# Patient Record
Sex: FEMALE | URBAN - METROPOLITAN AREA
[De-identification: names, ages, dates, MRNs, and addresses within clinical notes are randomized per-mention and may not be internally consistent; named-entity substitution may affect disease eponyms.]

---

## 2018-08-10 ENCOUNTER — OFFICE VISIT (OUTPATIENT)
Dept: FAMILY MEDICINE CLINIC | Age: 23
End: 2018-08-10

## 2018-08-10 VITALS
HEART RATE: 72 BPM | OXYGEN SATURATION: 99 % | TEMPERATURE: 98.4 F | DIASTOLIC BLOOD PRESSURE: 86 MMHG | RESPIRATION RATE: 18 BRPM | SYSTOLIC BLOOD PRESSURE: 129 MMHG | HEIGHT: 69 IN

## 2018-08-10 DIAGNOSIS — R21 RASH: ICD-10-CM

## 2018-08-10 DIAGNOSIS — F32.89 OTHER DEPRESSION: ICD-10-CM

## 2018-08-10 DIAGNOSIS — K60.2 ANAL FISSURE: Primary | ICD-10-CM

## 2018-08-10 PROBLEM — F32.A MILD DEPRESSION: Status: ACTIVE | Noted: 2018-08-10

## 2018-08-10 RX ORDER — CLOTRIMAZOLE AND BETAMETHASONE DIPROPIONATE 10; .64 MG/G; MG/G
CREAM TOPICAL 2 TIMES DAILY
Qty: 30 G | Refills: 1 | Status: SHIPPED | OUTPATIENT
Start: 2018-08-10

## 2018-08-10 RX ORDER — BUPROPION HYDROCHLORIDE 75 MG/1
75 TABLET ORAL 2 TIMES DAILY
Qty: 60 TAB | Refills: 2 | Status: SHIPPED | OUTPATIENT
Start: 2018-08-10 | End: 2018-09-21 | Stop reason: SDUPTHER

## 2018-08-10 RX ORDER — NORETHINDRONE ACETATE AND ETHINYL ESTRADIOL, ETHINYL ESTRADIOL AND FERROUS FUMARATE 1MG-10(24)
1 KIT ORAL DAILY
COMMUNITY
Start: 2018-06-20

## 2018-08-10 NOTE — MR AVS SNAPSHOT
[address] [address] 
[phone] Patient: Owen Biggs MRN: [id number] :1995 Visit Information Date & Time Provider Department Dept. Phone Encounter #  
 8/10/2018 11:00 AM Mayte Inman MD [address] [place] [address] [id number] Follow-up Instructions Return in about 1 month (around 9/10/2018). Upcoming Health Maintenance Date Due Hepatitis A Peds Age 1-18 (1 of 2 - Standard Series) 10/28/1998 DTaP/Tdap/Td series (1 - Tdap) 10/28/2004 HPV Age 9Y-34Y (1 of 3 - Female 3 Dose Series) 10/28/2008 Influenza Age 5 to Adult 2018 Allergies as of 8/10/2018  Review Complete On: 8/10/2018 By: Mayte Inman MD  
 No Known Allergies Current Immunizations  Never Reviewed No immunizations on file. Not reviewed this visit You Were Diagnosed With   
  
 Codes Comments Anal fissure    -  Primary ICD-10-CM: U21.5 ICD-9-CM: 565.0 Rash     ICD-10-CM: R21 
ICD-9-CM: 782.1 Other depression     ICD-10-CM: F32.89 ICD-9-CM: 193 Vitals BP Pulse Temp Resp Height(growth percentile) LMP  
 129/86 72 98.4 °F (36.9 °C) (Oral) 18 5' 9.25\" (1.759 m) 2018 SpO2 OB Status Smoking Status 99% Having regular periods Never Smoker Vitals History Preferred Pharmacy Pharmacy Name Phone [id number] [address] [address] [facility] [phone] Your Updated Medication List  
  
   
This list is accurate as of 8/10/18 12:14 PM.  Always use your most recent med list.  
  
  
  
  
 buPROPion 75 mg tablet Commonly known as:  [facility] Take 1 Tab by mouth two (2) times a day. clotrimazole 2 % vaginal cream  
Commonly known as:  GYNE-LOTRIMIN Apply to affected area daily for 14 days  
  
 clotrimazole-betamethasone topical cream  
Commonly known as:  Damon Mcmahon  
 Apply  to affected area two (2) times a day. LO LOESTRIN FE 1 mg-10 mcg (24)/10 mcg (2) Tab Generic drug:  norethindrone-e.estradiol-iron Take 1 Tab by mouth daily. Prescriptions Sent to Pharmacy Refills  
 clotrimazole (GYNE-LOTRIMIN) 2 % vaginal cream 0 Sig: Apply to affected area daily for 14 days Class: Normal  
 [facility] [address] [address] Ph #: [phone]  
 clotrimazole-betamethasone (LOTRISONE) topical cream 1 Sig: Apply  to affected area two (2) times a day. Class: Normal  
 [id number] [address] [facility] Ph #: [phone] Route: Topical  
 buPROPion (WELLBUTRIN) 75 mg tablet 2 Sig: Take 1 Tab by mouth two (2) times a day. Class: Normal  
 [id number] [address] [facility] Ph #: [phone] Route: Oral  
  
Follow-up Instructions Return in about 1 month (around 9/10/2018). Introducing [facility]! Mima Cochran introduces [employer] patient portal. Now you can access parts of your medical record, email your doctor's office, and request medication refills online. 1. In your internet browser, go to [email or web address]. [employer] 2. Click on the First Time User? Click Here link in the Sign In box. You will see the New Member Sign Up page. 3. Enter your [employer] Access Code exactly as it appears below. You will not need to use this code after youve completed the sign-up process. If you do not sign up before the expiration date, you must request a new code. · [employer] Access Code: [email or web address] Expires: 11/8/2018 12:14 PM 
 
4. Enter the last four digits of your Social Security Number (xxxx) and Date of Birth (mm/dd/yyyy) as indicated and click Submit. You will be taken to the next sign-up page. 5. Create a [employer] ID. This will be your MyChart login ID and cannot be changed, so think of one that is secure and easy to remember. 6. Create a [employer] password. You can change your password at any time. 7. Enter your Password Reset Question and Answer. This can be used at a later time if you forget your password. 8. Enter your e-mail address. You will receive e-mail notification when new information is available in [address] 9. Click Sign Up. You can now view and download portions of your medical record. 10. Click the Download Summary menu link to download a portable copy of your medical information. If you have questions, please visit the Frequently Asked Questions section of the [employer] website. Remember, [employer] is NOT to be used for urgent needs. For medical emergencies, dial 911. Now available from your iPhone and Android! Please provide this summary of care documentation to your next provider. If you have any questions after today's visit, please call [phone].

## 2018-08-10 NOTE — PROGRESS NOTES
1. Have you been to the ER, urgent care clinic since your last visit? Hospitalized since your last visit? No    2. Have you seen or consulted any other health care providers outside of the [address] [place] [address] since your last visit? Include any pap smears or colon screening.  No     Chief Complaint   Patient presents with   [address]    Complete Physical

## 2018-08-10 NOTE — PROGRESS NOTES
Chief Complaint   Patient presents with   [address]    Complete Physical     she is a 23y.o. year old female who presents for evalution. She is a new patient  Here to complain about anal itching  Has noted blood on the tissue not really painful and no significant blood  She first noted this 5 months ago    Reviewed PmHx, RxHx, FmHx, SocHx, AllgHx and updated and dated in the chart. Aspirin yes ____   No____ N/A____    Patient Active Problem List    Diagnosis    Mild depression ([facility] [address]       Nurse notes were reviewed and copied and are correct  Review of Systems - negative except as listed above in the HPI    Objective:     Vitals:    08/10/18 1145   BP: 129/86   Pulse: 72   Resp: 18   Temp: 98.4 °F (36.9 °C)   TempSrc: Oral   SpO2: 99%   Height: 5' 9.25\" (1.759 m)       Physical Examination: General appearance - alert, well appearing, and in no distress  Mental status - alert, oriented to person, place, and time    Skin - open fissure above anus extending to full track between gluteus  Also red macules under breast line as well as between    Assessment/ Plan:   Diagnoses and all orders for this visit:    1. Anal fissure  -     clotrimazole (GYNE-LOTRIMIN) 2 % vaginal cream; Apply to affected area daily for 14 days    2. Rash  -     clotrimazole-betamethasone (LOTRISONE) topical cream; Apply  to affected area two (2) times a day. 3. Other depression  -     buPROPion (WELLBUTRIN) 75 mg tablet; Take 1 Tab by mouth two (2) times a day. Follow-up Disposition:  Return in about 1 month (around 9/10/2018). ICD-10-CM ICD-9-CM    1. Anal fissure K60.2 565.0 clotrimazole (GYNE-LOTRIMIN) 2 % vaginal cream   2. Rash R21 782.1 clotrimazole-betamethasone (LOTRISONE) topical cream   3. Other depression F32.89 311 buPROPion (WELLBUTRIN) 75 mg tablet       I have discussed the diagnosis with the patient and the intended plan as seen in the above orders.   The patient has received an after-visit summary and questions were answered concerning future plans. Medication Side Effects and Warnings were discussed with patient: yes  Patient Labs were reviewed and or requested: yes  Patient Past Records were reviewed and or requested: yes        There are no Patient Instructions on file for this visit.     The patient verbalizes understanding and agrees with the plan of care        Patient has the advanced directives booklet to review

## 2018-09-10 ENCOUNTER — TELEPHONE (OUTPATIENT)
Dept: FAMILY MEDICINE CLINIC | Age: 23
End: 2018-09-10

## 2018-09-10 NOTE — TELEPHONE ENCOUNTER
----- Message from Dewey Abdalla sent at 9/7/2018  8:26 AM EDT -----  Regarding: FW: Dr. Edilson Rossi      ----- Message -----     From: Kwan Ng     Sent: 9/6/2018   3:21 PM       To: [facility] Front Office  Subject: Dr. Edilson Rossi                              Pt is requesting a call, with questions about Bupropion. Best contact number [phone].

## 2018-09-21 ENCOUNTER — OFFICE VISIT (OUTPATIENT)
Dept: FAMILY MEDICINE CLINIC | Age: 23
End: 2018-09-21

## 2018-09-21 VITALS
RESPIRATION RATE: 18 BRPM | BODY MASS INDEX: 21.03 KG/M2 | DIASTOLIC BLOOD PRESSURE: 74 MMHG | HEART RATE: 80 BPM | SYSTOLIC BLOOD PRESSURE: 112 MMHG | WEIGHT: 142 LBS | HEIGHT: 69 IN | TEMPERATURE: 98.4 F | OXYGEN SATURATION: 99 %

## 2018-09-21 DIAGNOSIS — F32.89 OTHER DEPRESSION: ICD-10-CM

## 2018-09-21 DIAGNOSIS — K60.2 ANAL FISSURE: Primary | ICD-10-CM

## 2018-09-21 RX ORDER — BUPROPION HYDROCHLORIDE 75 MG/1
75 TABLET ORAL 2 TIMES DAILY
Qty: 180 TAB | Refills: 1 | Status: SHIPPED | OUTPATIENT
Start: 2018-09-21 | End: 2018-11-03 | Stop reason: SDUPTHER

## 2018-09-21 NOTE — MR AVS SNAPSHOT
[address] [address] 
[phone] Patient: Yuliya Boucher MRN: [id number] :1995 Visit Information Date & Time Provider Department Dept. Phone Encounter #  
 2018 10:45 AM Eriberto Webb MD [facility] [phone] [id number] Upcoming Health Maintenance Date Due Hepatitis A Peds Age 1-18 (1 of 2 - Standard Series) 10/28/1998 DTaP/Tdap/Td series (1 - Tdap) 10/28/2004 HPV Age 9Y-34Y (1 of 3 - Female 3 Dose Series) 10/28/2008 Influenza Age 5 to Adult 2019* *Topic was postponed. The date shown is not the original due date. Allergies as of 2018  Review Complete On: 2018 By: Manjit Perez LPN No Known Allergies Current Immunizations  Never Reviewed Name Date Influenza Vaccine 9/15/2018 Not reviewed this visit You Were Diagnosed With   
  
 Codes Comments Other depression     ICD-10-CM: F32.89 ICD-9-CM: 631 Vitals BP Pulse Temp Resp Height(growth percentile) Weight(growth percentile) 112/74 80 98.4 °F (36.9 °C) (Oral) 18 5' 9.25\" (1.759 m) 142 lb (64.4 kg) LMP SpO2 BMI OB Status Smoking Status 2018 99% 20.82 kg/m2 Having regular periods Never Smoker Vitals History BMI and BSA Data Body Mass Index Body Surface Area  
 20.82 kg/m 2 1.77 m 2 [id number] [address] [phone] Your Updated Medication List  
  
   
This list is accurate as of 18 11:09 AM.  Always use your most recent med list.  
  
  
  
  
 buPROPion 75 mg tablet Commonly known as:  [facility] Take 1 Tab by mouth two (2) times a day. clotrimazole 2 % vaginal cream  
Commonly known as:  GYNE-LOTRIMIN Apply to affected area daily for 14 days  
  
 clotrimazole-betamethasone topical cream  
 Commonly known as:  Gilda Nelson Apply  to affected area two (2) times a day. LO LOESTRIN FE 1 mg-10 mcg (24)/10 mcg (2) Tab Generic drug:  norethindrone-e.estradiol-iron Take 1 Tab by mouth daily. Prescriptions Sent to Pharmacy Refills buPROPion (WELLBUTRIN) 75 mg tablet 1 Sig: Take 1 Tab by mouth two (2) times a day. Class: Normal  
 Pharmacy: [facility]/pharmacy #[id number] - [place], [address] [facility] Ph #: [phone] Route: Oral  
  
Introducing [facility]! Dear Chase Gaucher: Thank you for requesting a [employer] account. Our records indicate that you already have an active [employer] account. You can access your account anytime at [email or web address] Did you know that you can access your hospital and ER discharge instructions at any time in [employer]? You can also review all of your test results from your hospital stay or ER visit. Additional Information If you have questions, please visit the Frequently Asked Questions section of the [employer] website at [email or web address] Remember, [employer] is NOT to be used for urgent needs. For medical emergencies, dial 911. Now available from your iPhone and Android! Please provide this summary of care documentation to your next provider. Your primary care clinician is listed as Fabricio Hawkins. If you have any questions after today's visit, please call [phone].

## 2018-09-21 NOTE — PROGRESS NOTES
Chief Complaint   Patient presents with    Follow-up     anal fissure     she is a 23y.o. year old female who presents for evalution. She had success with the creams on the anal and the vaginal areas  The fissure has resolved  She also took wellbutrin and that is working well  She denies nausea or headaches  Able to work and function the way she desires to  Reviewed PmHx, RxHx, FmHx, SocHx, AllgHx and updated and dated in the chart. Aspirin yes ____   No____ N/A____    Patient Active Problem List    Diagnosis    Mild depression ([facility] [address]       Nurse notes were reviewed and copied and are correct  Review of Systems - negative except as listed above in the HPI    Objective:     Vitals:    09/21/18 1059   BP: 112/74   Pulse: 80   Resp: 18   Temp: 98.4 °F (36.9 °C)   TempSrc: Oral   SpO2: 99%   Weight: 142 lb (64.4 kg)   Height: 5' 9.25\" (1.759 m)       Physical Examination: General appearance - alert, well appearing, and in no distress  Mental status - alert, oriented to person, place, and time  Chest - clear to auscultation, no wheezes, rales or rhonchi, symmetric air entry  Heart - normal rate, regular rhythm, normal S1, S2, no murmurs, rubs, clicks or gallops      Assessment/ Plan:   Diagnoses and all orders for this visit:    1. Anal fissure  resolved  2. Other depression  -     buPROPion (WELLBUTRIN) 75 mg tablet; Take 1 Tab by mouth two (2) times a day. Doing well on this med. No changes needed     Follow-up Disposition: Not on File    ICD-10-CM ICD-9-CM    1. Anal fissure K60.2 565.0    2. Other depression F32.89 311 buPROPion (WELLBUTRIN) 75 mg tablet       I have discussed the diagnosis with the patient and the intended plan as seen in the above orders. The patient has received an after-visit summary and questions were answered concerning future plans.      Medication Side Effects and Warnings were discussed with patient: yes  Patient Labs were reviewed and or requested: yes  Patient Past Records were reviewed and or requested: yes        There are no Patient Instructions on file for this visit.     The patient verbalizes understanding and agrees with the plan of care        Patient has the advanced directives booklet to review

## 2018-09-21 NOTE — PROGRESS NOTES
1. Have you been to the ER, urgent care clinic since your last visit? Hospitalized since your last visit? No    2. Have you seen or consulted any other health care providers outside of the [address] [place] [address] since your last visit? Include any pap smears or colon screening.  [place] doctor      Chief Complaint   Patient presents with    Follow-up     anal fissure

## 2018-11-03 DIAGNOSIS — F32.89 OTHER DEPRESSION: ICD-10-CM

## 2018-11-05 RX ORDER — BUPROPION HYDROCHLORIDE 75 MG/1
TABLET ORAL
Qty: 60 TAB | Refills: 5 | Status: SHIPPED | OUTPATIENT
Start: 2018-11-05 | End: 2019-06-22 | Stop reason: SDUPTHER

## 2019-06-22 DIAGNOSIS — F32.89 OTHER DEPRESSION: ICD-10-CM

## 2019-06-23 RX ORDER — BUPROPION HYDROCHLORIDE 75 MG/1
TABLET ORAL
Qty: 180 TAB | Refills: 0 | Status: SHIPPED | OUTPATIENT
Start: 2019-06-23

## 2019-07-29 ENCOUNTER — TELEPHONE (OUTPATIENT)
Dept: FAMILY MEDICINE CLINIC | Age: 24
End: 2019-07-29

## 2019-07-29 NOTE — TELEPHONE ENCOUNTER
----- Message from Claudetta Kyle sent at 7/29/2019  6:46 AM EDT -----  Regarding: FW: Dr. Jazmin Motta      ----- Message -----  From: Rose Ramirez  Sent: 7/26/2019   3:11 PM EDT  To: [facility] Front Office  Subject: Dr. Kristine Baker first and last name: ((Patient ))      Reason for call: forms to be filled out       Callback required yes/no and why: Yes. .. Contact Mother       Best contact number(s): [phone] Fax: [phone]      Details to clarify the request: Pt would like for her Mother Kevon Hassan to be contacted on forms are completed. Pt would like information to be faxed. Pt would like the forms filled out As soon as possible.  Form are needed before Aug 1       Rodger Lao

## 2022-03-19 PROBLEM — F32.A MILD DEPRESSION: Status: ACTIVE | Noted: 2018-08-10

## 2023-05-17 RX ORDER — CLOTRIMAZOLE AND BETAMETHASONE DIPROPIONATE 10; .64 MG/G; MG/G
CREAM TOPICAL 2 TIMES DAILY
COMMUNITY
Start: 2018-08-10

## 2023-05-17 RX ORDER — BUPROPION HYDROCHLORIDE 75 MG/1
1 TABLET ORAL 2 TIMES DAILY
COMMUNITY
Start: 2019-06-23

## 2023-05-17 RX ORDER — NORETHINDRONE ACETATE AND ETHINYL ESTRADIOL, ETHINYL ESTRADIOL AND FERROUS FUMARATE 1MG-10(24)
1 KIT ORAL DAILY
COMMUNITY
Start: 2018-06-20